# Patient Record
Sex: MALE | Race: WHITE | Employment: STUDENT | URBAN - NONMETROPOLITAN AREA
[De-identification: names, ages, dates, MRNs, and addresses within clinical notes are randomized per-mention and may not be internally consistent; named-entity substitution may affect disease eponyms.]

---
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
## 2021-10-01 NOTE — PROGRESS NOTES
619  Subjective Finding:    Chief compalint: Patient presents with:  Back Pain  Neck Pain  , Pain Scale: 8/10, Intensity: sharp, Duration: 6 days, Radiating: no.    Date of injury:     Activities that the pain restricts:   Home/household/hobbies/social activities: yes.  Work duties: no.  Sleep: no.  Makes symptoms better: rest.  Makes symptoms worse: activity, thoracic extension and thoracic flexion.  Have you seen anyone else for the symptoms? No.  Work related: no.  Automobile related injury: no.    Objective and Assessment:    Posture Analysis:   High shoulder: .  Head tilt: left.  High iliac crest: left.  Head carriage: neutral.  Thoracic Kyphosis: neutral.  Lumbar Lordosis: neutral.    Lumbar Range of Motion: left lateral flexion decreased.  Cervical Range of Motion: .ROM restricted  Thoracic Range of Motion: left lateral flexion decreased and left rotation decreased.  Extremity Range of Motion: .    Palpation:   T paraspinals: sharp pain, no    Segmental dysfunction pre-treatment and treatment area: T5, T7, T8 and L2.  C5-6    Assessment post-treatment:  Cervical: .  Thoracic: ROM increased and pain and tenderness decreased.  Lumbar: ROM increased.    Comments: .      Complicating Factors: .    Plan / Procedure:    Treatment plan: PRN.  Instructed patient: stretch as instructed at visit.  Short term goals: increase ROM.  Long term goals: restore normal function.  Prognosis: excellent.              PROVIDER:[TOKEN:[[id number]]]